# Patient Record
Sex: FEMALE | Race: WHITE | URBAN - METROPOLITAN AREA
[De-identification: names, ages, dates, MRNs, and addresses within clinical notes are randomized per-mention and may not be internally consistent; named-entity substitution may affect disease eponyms.]

---

## 2020-01-02 DIAGNOSIS — G47.00 PERSISTENT DISORDER OF INITIATING OR MAINTAINING SLEEP: ICD-10-CM

## 2020-01-02 RX ORDER — TRAZODONE HYDROCHLORIDE 100 MG/1
TABLET ORAL
Qty: 90 TABLET | Refills: 2 | COMMUNITY
Start: 2020-01-02

## 2025-04-24 ENCOUNTER — TRANSFERRED RECORDS (OUTPATIENT)
Dept: HEALTH INFORMATION MANAGEMENT | Facility: CLINIC | Age: 70
End: 2025-04-24
Payer: COMMERCIAL

## 2025-04-29 ENCOUNTER — TRANSCRIBE ORDERS (OUTPATIENT)
Dept: OTHER | Age: 70
End: 2025-04-29

## 2025-04-29 DIAGNOSIS — H21.1X1 OTHER VASCULAR DISORDERS OF IRIS AND CILIARY BODY, RIGHT EYE: ICD-10-CM

## 2025-04-29 DIAGNOSIS — H40.1131 PRIMARY OPEN ANGLE GLAUCOMA (POAG) OF BOTH EYES, MILD STAGE: Primary | ICD-10-CM

## 2025-05-08 ENCOUNTER — TELEPHONE (OUTPATIENT)
Dept: OPHTHALMOLOGY | Facility: CLINIC | Age: 70
End: 2025-05-08
Payer: COMMERCIAL

## 2025-05-08 NOTE — TELEPHONE ENCOUNTER
Called all numbers on file and I am not able to confirm who the numbers belong too -     Ok to schedule for next available with Dr. Jones /Dr. Bennett / Dr. Whitehead in a new [facility] Communication Facilitator on 5/8/2025 at 2:24 PM

## 2025-05-08 NOTE — TELEPHONE ENCOUNTER
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  Requesting Dr. Jones.     Action Taken: Message routed to:  [facility] ([facility] Ophthalmology    Travel Screening: Not Applicable     Date of Service:

## 2025-05-08 NOTE — TELEPHONE ENCOUNTER
Called the referring clinic at 1430 requesting a demographics - left a VM with [facility] - I left our clinic fax number to have the demographics sheet sent to us to verify we have the correct phone numbers on file     Kirsten Burris Communication Facilitator on 5/8/2025 at 4:25 PM

## 2025-06-03 ENCOUNTER — TRANSFERRED RECORDS (OUTPATIENT)
Dept: HEALTH INFORMATION MANAGEMENT | Facility: CLINIC | Age: 70
End: 2025-06-03
Payer: COMMERCIAL

## 2025-06-03 LAB — RETINOPATHY: NEGATIVE

## 2025-06-05 ENCOUNTER — OFFICE VISIT (OUTPATIENT)
Dept: OPHTHALMOLOGY | Facility: CLINIC | Age: 70
End: 2025-06-05
Attending: OPHTHALMOLOGY
Payer: COMMERCIAL

## 2025-06-05 DIAGNOSIS — H40.1131 PRIMARY OPEN ANGLE GLAUCOMA (POAG) OF BOTH EYES, MILD STAGE: ICD-10-CM

## 2025-06-05 DIAGNOSIS — Z96.1 PSEUDOPHAKIA, BOTH EYES: ICD-10-CM

## 2025-06-05 DIAGNOSIS — H04.123 DRY EYES: ICD-10-CM

## 2025-06-05 DIAGNOSIS — H21.1X1 OTHER VASCULAR DISORDERS OF IRIS AND CILIARY BODY, RIGHT EYE: Primary | ICD-10-CM

## 2025-06-05 PROCEDURE — 76513 OPH US DX ANT SGM US UNI/BI: CPT | Performed by: OPHTHALMOLOGY

## 2025-06-05 PROCEDURE — 92020 GONIOSCOPY: CPT | Performed by: OPHTHALMOLOGY

## 2025-06-05 PROCEDURE — 92285 EXTERNAL OCULAR PHOTOGRAPHY: CPT | Performed by: OPHTHALMOLOGY

## 2025-06-05 RX ORDER — ASCORBIC ACID 500 MG
TABLET ORAL DAILY
COMMUNITY

## 2025-06-05 RX ORDER — LATANOPROST 50 UG/ML
SOLUTION/ DROPS OPHTHALMIC
COMMUNITY
Start: 2025-04-07

## 2025-06-05 RX ORDER — PRAVASTATIN SODIUM 80 MG/1
TABLET ORAL
COMMUNITY

## 2025-06-05 RX ORDER — CYCLOSPORINE 0.5 MG/ML
1 EMULSION OPHTHALMIC 2 TIMES DAILY
COMMUNITY

## 2025-06-05 RX ORDER — CHLORAL HYDRATE 500 MG
1000 CAPSULE ORAL
COMMUNITY

## 2025-06-05 RX ORDER — VITAMIN B COMPLEX
1 TABLET ORAL DAILY
COMMUNITY

## 2025-06-05 ASSESSMENT — VISUAL ACUITY
METHOD: SNELLEN - LINEAR
OS_SC: 20/20
OS_SC+: -1
OD_SC: 20/20
OD_SC+: -2

## 2025-06-05 ASSESSMENT — CONF VISUAL FIELD
OD_INFERIOR_TEMPORAL_RESTRICTION: 0
OS_INFERIOR_TEMPORAL_RESTRICTION: 0
OS_SUPERIOR_NASAL_RESTRICTION: 0
OD_INFERIOR_NASAL_RESTRICTION: 0
METHOD: COUNTING FINGERS
OS_NORMAL: 1
OS_SUPERIOR_TEMPORAL_RESTRICTION: 0
OD_NORMAL: 1
OS_INFERIOR_NASAL_RESTRICTION: 0
OD_SUPERIOR_NASAL_RESTRICTION: 0
OD_SUPERIOR_TEMPORAL_RESTRICTION: 0

## 2025-06-05 ASSESSMENT — TONOMETRY
OS_IOP_MMHG: 18
IOP_METHOD: ICARE
OD_IOP_MMHG: 14

## 2025-06-05 NOTE — PROGRESS NOTES
HPI       Consult For    In right eye.  This started 12 months ago.  Associated symptoms include dryness.  Negative for eye pain, headache and photophobia.  Treatments tried include eye drops and artificial tears.  Pain was noted as 0/10. Additional comments: Ely Agarwal is being seen for a consult today by the request of Dr. Oneill for an iris mass             Comments    She states that she occasionally gets some pressure feeling in her right eye.  Patient denies having any eye discomfort.    She uses Refresh and Restasis in each eye to treat her dry eye symptoms.  She also uses Latanoprost at night in her left eye.      MADONNA Valencia 1:01 PM  June 5, 2025                 Last edited by Cecy Yates COT on 6/5/2025  1:02 PM.        Dr. Whitehead exam    History taken by staff was reviewed and verified.    70 year old female.    Additional HPI:        Location:  right eye    Signs/symptoms:  Technicians note reviewed and verified. Agree with history upon current presentation.  Pt was referred by Dr. Jerel Oneill from [facility]. No photopsia or floaters.    Severity:  Mild   Duration:  Chronic   Quality:  Stable     Related System Review:  No recent changes in general health.  A 10-system review was conducted and was negative except for what's noted in the HPI. The following systems were reviewed: Constitutional, cardiovascular, respiratory, gastrointestinal, genitourinary, musculoskeletal, neurologic, psychiatric, endocrinological, and hematological.    PMH: mild POAG; pseudophakia each eye; s/p SLT each eye, s/p iStent each eye     Current Outpatient Medications   Medication Sig Dispense Refill    cycloSPORINE (RESTASIS) 0.05 % ophthalmic emulsion 1 drop 2 times daily.      fish oil-omega-3 fatty acids 1000 MG capsule Take 1,000 mg by mouth.      latanoprost (XALATAN) 0.005 % ophthalmic solution Instill 1 drop into left eye every evening*      pravastatin (PRAVACHOL) 80 MG tablet Take 1 Tablet  (80 mg) by mouth daily at bedtime.*      vitamin C (ASCORBIC ACID) 500 MG tablet daily.      Vitamin D3 (CHOLECALCIFEROL) 25 mcg (1000 units) tablet Take 1 tablet by mouth daily.       No current facility-administered medications for this visit.       New Social History:    Social History     Socioeconomic History    Marital status: Single     Spouse name: None    Number of children: None    Years of education: None    Highest education level: None   Tobacco Use    Smoking status: Former     Types: Cigarettes    Smokeless tobacco: Never       Constitutional:    Appears well   Mental Status:  oriented to person, place, time  Affect:  appropriate    Confrontation VF:  FTFC OU  External:  Normal  EOM:  Full  Alignment:  Normal  Pupils:  Equal, reactive with no rAPD    Slit Lamp Exam:    Tmax 15/18 per records review  IOP:  OD: 12  OS: 17  (mmHg by GAT at 1:46 PM on  6/5/2025) Latanoprost left eye hs     Pachym 583/585    Gonioscopy 6/5/2025   Right eye: minimal iris elevation 6-7 oclock; no increased pigmentation over the area   D40r each eye; 2 iStents in each: right eye: inferior; left eye: nasal     No evidence of choroidal/iris lesions on oblique illumination     Right: Lids/Adnexa:  Normal    Lacrimal: Normal    Conjunctiva/Sclera:  Normal    Cornea:  Normal    Ant. Chamber: Deep and quiet    Iris: Inferior ~6-7 o'clock semicircular engorged iris vessel    Lens: MF-IOL with 1-2+ PCO       Left: Lids/Adnexa: Normal    Lacrimal: Normal    Conjunctiva/Sclera: Normal    Cornea: Normal    Ant. Chamber: Deep and Quiet    Iris: Normal    Lens: MF-IOL    6/5/2025 6/5/2025 6/5/2025 6/5/2025      Dilated right eye 6/5/2025 (pt refused left eye dilation)     Media:       Right: 20/50 (dry eye)   Left: 20/30    Vitreous:     Right: PVD   Left:   PVD    Fundi:       Right: Disc: C/D: 0.5    Vessels:  Normal    Periphery:  Normal    Macula:  Normal     Left: N/a     Assessment:       ICD-10-CM    1. 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 Primary open angle glaucoma (POAG) of both eyes, mild stage  H40.1131 Adult Eye  Referral      3. Pseudophakia, both eyes  Z96.1       4. Dry eyes  H04.123         Plan:     Reviewed findings with pt and her friend  Will continue to monitor the inferior iris with 3 months follow up  Increase artificial tears to q 3-4 hours while awake  Continue to follow with Dr. Oneill for glaucoma  Continue Restasis ou bid   Pt will continue Latanoprost left eye   Discussed signs and symptoms of retinal tears or detachments.  Advised patient to come back immediately with any central or peripheral visual changes.  Patient verbalized understanding.     Follow in 3 months with fine UBM right eye and ON OCT     Patient education:   No apparent learning barriers were identified.  Explained diagnosis and treatment plan.  Instructions provided.  Patient expressed understanding of the content.    Attending Physician Attestation:  Complete documentation of historical and exam elements from today's encounter can be found in the full encounter summary report (not reduplicated in this progress note).  I personally obtained the chief complaint(s) and history of present illness.  I confirmed and edited as necessary the review of systems, past medical/surgical history, family history, social history, and examination findings as documented by others; and I examined the patient myself.  I personally reviewed the relevant tests, images, and reports as documented above.  I formulated and edited as necessary the assessment and plan and discussed the findings and management plan with the patient and family. - MD Ruchi Henry MD ....................  6/5/2025   1:46 PM

## 2025-06-05 NOTE — LETTER
6/5/2025       RE: Ely Agarwal  [address] [facility] [place] [address]     Dear Dr. Oneill,    Thank you for referring your patient, Ely Agarwal, to the [facility] [place] at [facility]. Please see a copy of my visit note below.    HPI       Consult For    In right eye.  This started 12 months ago.  Associated symptoms include dryness.  Negative for eye pain, headache and photophobia.  Treatments tried include eye drops and artificial tears.  Pain was noted as 0/10. Additional comments: Ely Agarwal is being seen for a consult today by the request of Dr. Oneill for an iris mass             Comments    She states that she occasionally gets some pressure feeling in her right eye.  Patient denies having any eye discomfort.    She uses Refresh and Restasis in each eye to treat her dry eye symptoms.  She also uses Latanoprost at night in her left eye.      MADONNA Valencia 1:01 PM  June 5, 2025                 Last edited by eCcy Yates COT on 6/5/2025  1:02 PM.        Dr. Whitehead exam    History taken by staff was reviewed and verified.    70 year old female.    Additional HPI:        Location:  right eye    Signs/symptoms:  Technicians note reviewed and verified. Agree with history upon current presentation.  Pt was referred by Dr. Jerel Oneill from [facility]. No photopsia or floaters.    Severity:  Mild   Duration:  Chronic   Quality:  Stable     Related System Review:  No recent changes in general health.  A 10-system review was conducted and was negative except for what's noted in the HPI. The following systems were reviewed: Constitutional, cardiovascular, respiratory, gastrointestinal, genitourinary, musculoskeletal, neurologic, psychiatric, endocrinological, and hematological.    PMH: mild POAG; pseudophakia each eye; s/p SLT each eye, s/p iStent each eye     Current Outpatient Medications   Medication Sig Dispense  Refill     cycloSPORINE (RESTASIS) 0.05 % ophthalmic emulsion 1 drop 2 times daily.       fish oil-omega-3 fatty acids 1000 MG capsule Take 1,000 mg by mouth.       latanoprost (XALATAN) 0.005 % ophthalmic solution Instill 1 drop into left eye every evening*       pravastatin (PRAVACHOL) 80 MG tablet Take 1 Tablet (80 mg) by mouth daily at bedtime.*       vitamin C (ASCORBIC ACID) 500 MG tablet daily.       Vitamin D3 (CHOLECALCIFEROL) 25 mcg (1000 units) tablet Take 1 tablet by mouth daily.       No current facility-administered medications for this visit.       New Social History:    Social History     Socioeconomic History     Marital status: Single     Spouse name: None     Number of children: None     Years of education: None     Highest education level: None   Tobacco Use     Smoking status: Former     Types: Cigarettes     Smokeless tobacco: Never       Constitutional:    Appears well   Mental Status:  oriented to person, place, time  Affect:  appropriate    Confrontation VF:  FTFC OU  External:  Normal  EOM:  Full  Alignment:  Normal  Pupils:  Equal, reactive with no rAPD    Slit Lamp Exam:    Tmax 15/18 per records review  IOP:  OD: 12  OS: 17  (mmHg by GAT at 1:46 PM on  6/5/2025) Latanoprost left eye hs     Pachym 583/585    Gonioscopy 6/5/2025   Right eye: minimal iris elevation 6-7 oclock; no increased pigmentation over the area   D40r each eye; 2 iStents in each: right eye: inferior; left eye: nasal     No evidence of choroidal/iris lesions on oblique illumination     Right: Lids/Adnexa:  Normal    Lacrimal: Normal    Conjunctiva/Sclera:  Normal    Cornea:  Normal    Ant. Chamber: Deep and quiet    Iris: Inferior ~6-7 o'clock semicircular engorged iris vessel    Lens: MF-IOL with 1-2+ PCO       Left: Lids/Adnexa: Normal    Lacrimal: Normal    Conjunctiva/Sclera: Normal    Cornea: Normal    Ant. Chamber: Deep and Quiet    Iris: Normal    Lens: MF-IOL    6/5/2025 6/5/2025 6/5/2025          6/5/2025      Dilated right eye 6/5/2025 (pt refused left eye dilation)     Media:       Right: 20/50 (dry eye)   Left: 20/30    Vitreous:     Right: PVD   Left:   PVD    Fundi:       Right: Disc: C/D: 0.5    Vessels:  Normal    Periphery:  Normal    Macula:  Normal     Left: N/a     Assessment:       ICD-10-CM    1. 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 Primary open angle glaucoma (POAG) of both eyes, mild stage  H40.1131 Adult Eye  Referral      3. Pseudophakia, both eyes  Z96.1       4. Dry eyes  H04.123         Plan:     Reviewed findings with pt and her friend  Will continue to monitor the inferior iris with 3 months follow up  Increase artificial tears to q 3-4 hours while awake  Continue to follow with Dr. Oneill for glaucoma  Continue Restasis ou bid   Pt will continue Latanoprost left eye   Discussed signs and symptoms of retinal tears or detachments.  Advised patient to come back immediately with any central or peripheral visual changes.  Patient verbalized understanding.     Follow in 3 months with fine UBM right eye and ON OCT     Patient education:   No apparent learning barriers were identified.  Explained diagnosis and treatment plan.  Instructions provided.  Patient expressed understanding of the content.    Attending Physician Attestation:  Complete documentation of historical and exam elements from today's encounter can be found in the full encounter summary report (not reduplicated in this progress note).  I personally obtained the chief complaint(s) and history of present illness.  I confirmed and edited as necessary the review of systems, past medical/surgical history, family history, social history, and examination findings as documented by others; and I examined the patient myself.  I personally reviewed the relevant  tests, images, and reports as documented above.  I formulated and edited as necessary the assessment and plan and discussed the findings and management plan with the patient and family. - MD Ruchi Henry MD ....................  6/5/2025   1:46 PM      Again, thank you for allowing me to participate in the care of your patient.      Sincerely,    Ruchi Whitehead MD